# Patient Record
Sex: MALE | Race: OTHER | Employment: OTHER | URBAN - METROPOLITAN AREA
[De-identification: names, ages, dates, MRNs, and addresses within clinical notes are randomized per-mention and may not be internally consistent; named-entity substitution may affect disease eponyms.]

---

## 2022-03-03 ENCOUNTER — CONSULTATION/EVALUATION (OUTPATIENT)
Dept: URBAN - METROPOLITAN AREA CLINIC 39 | Facility: CLINIC | Age: 77
End: 2022-03-03

## 2022-03-03 DIAGNOSIS — H25.811: ICD-10-CM

## 2022-03-03 DIAGNOSIS — Z98.890: ICD-10-CM

## 2022-03-03 DIAGNOSIS — H40.031: ICD-10-CM

## 2022-03-03 DIAGNOSIS — H25.812: ICD-10-CM

## 2022-03-03 PROCEDURE — 92004 COMPRE OPH EXAM NEW PT 1/>: CPT

## 2022-03-03 PROCEDURE — 92136TC INTERFEROMETRY - TECHNICAL COMPONENT

## 2022-03-03 PROCEDURE — V2799PMN IMPRIMIS PRED-MOXI-NEPAF 5ML

## 2022-03-03 PROCEDURE — 92015 DETERMINE REFRACTIVE STATE: CPT

## 2022-03-03 ASSESSMENT — VISUAL ACUITY
OD_SC: 20/70-1
OS_CC: J3
OD_BAT: 20/400 WITH MR
OS_SC: 20/60-1
OD_CC: J8
OD_SC: J12
OS_SC: J12
OS_BAT: 20/400 WITH MR

## 2022-03-03 ASSESSMENT — TONOMETRY
OD_IOP_MMHG: 20
OS_IOP_MMHG: 20

## 2022-03-03 NOTE — PATIENT DISCUSSION
***  Dr. Sloan Seek decided after consultation that he would recommend Dr. Parish Dorman to the surgery due to the density of the cataract and the increased risk for dropped lens which would require more surgery. ***.